# Patient Record
Sex: MALE | Race: WHITE
[De-identification: names, ages, dates, MRNs, and addresses within clinical notes are randomized per-mention and may not be internally consistent; named-entity substitution may affect disease eponyms.]

---

## 2017-02-10 ENCOUNTER — HOSPITAL ENCOUNTER (EMERGENCY)
Dept: HOSPITAL 62 - ER | Age: 62
LOS: 1 days | Discharge: HOME | End: 2017-02-11
Payer: COMMERCIAL

## 2017-02-10 DIAGNOSIS — R11.0: ICD-10-CM

## 2017-02-10 DIAGNOSIS — I71.2: ICD-10-CM

## 2017-02-10 DIAGNOSIS — R42: Primary | ICD-10-CM

## 2017-02-10 PROCEDURE — 85025 COMPLETE CBC W/AUTO DIFF WBC: CPT

## 2017-02-10 PROCEDURE — 82550 ASSAY OF CK (CPK): CPT

## 2017-02-10 PROCEDURE — 71275 CT ANGIOGRAPHY CHEST: CPT

## 2017-02-10 PROCEDURE — 80053 COMPREHEN METABOLIC PANEL: CPT

## 2017-02-10 PROCEDURE — 82553 CREATINE MB FRACTION: CPT

## 2017-02-10 PROCEDURE — 99285 EMERGENCY DEPT VISIT HI MDM: CPT

## 2017-02-10 PROCEDURE — 36415 COLL VENOUS BLD VENIPUNCTURE: CPT

## 2017-02-10 PROCEDURE — 84484 ASSAY OF TROPONIN QUANT: CPT

## 2017-02-11 VITALS — DIASTOLIC BLOOD PRESSURE: 76 MMHG | SYSTOLIC BLOOD PRESSURE: 112 MMHG

## 2017-02-11 LAB
ALBUMIN SERPL-MCNC: 3.5 G/DL (ref 3.5–5)
ALP SERPL-CCNC: 56 U/L (ref 38–126)
ALT SERPL-CCNC: 38 U/L (ref 21–72)
ANION GAP SERPL CALC-SCNC: 12 MMOL/L (ref 5–19)
AST SERPL-CCNC: 33 U/L (ref 17–59)
BASOPHILS # BLD AUTO: 0 10^3/UL (ref 0–0.2)
BASOPHILS NFR BLD AUTO: 0.6 % (ref 0–2)
BILIRUB DIRECT SERPL-MCNC: 0 MG/DL (ref 0–0.3)
BILIRUB SERPL-MCNC: 0.5 MG/DL (ref 0.2–1.3)
BUN SERPL-MCNC: 20 MG/DL (ref 7–20)
CALCIUM: 8.6 MG/DL (ref 8.4–10.2)
CHLORIDE SERPL-SCNC: 104 MMOL/L (ref 98–107)
CK MB SERPL-MCNC: 1.3 NG/ML (ref ?–4.55)
CK SERPL-CCNC: 102 U/L (ref 55–170)
CO2 SERPL-SCNC: 24 MMOL/L (ref 22–30)
CREAT SERPL-MCNC: 0.88 MG/DL (ref 0.52–1.25)
EOSINOPHIL # BLD AUTO: 0.2 10^3/UL (ref 0–0.6)
EOSINOPHIL NFR BLD AUTO: 3.8 % (ref 0–6)
ERYTHROCYTE [DISTWIDTH] IN BLOOD BY AUTOMATED COUNT: 13.2 % (ref 11.5–14)
GLUCOSE SERPL-MCNC: 105 MG/DL (ref 75–110)
HCT VFR BLD CALC: 41.6 % (ref 37.9–51)
HGB BLD-MCNC: 13.9 G/DL (ref 13.5–17)
HGB HCT DIFFERENCE: 0.1
LYMPHOCYTES # BLD AUTO: 1.1 10^3/UL (ref 0.5–4.7)
LYMPHOCYTES NFR BLD AUTO: 17.6 % (ref 13–45)
MCH RBC QN AUTO: 30.2 PG (ref 27–33.4)
MCHC RBC AUTO-ENTMCNC: 33.3 G/DL (ref 32–36)
MCV RBC AUTO: 91 FL (ref 80–97)
MONOCYTES # BLD AUTO: 0.8 10^3/UL (ref 0.1–1.4)
MONOCYTES NFR BLD AUTO: 12.1 % (ref 3–13)
NEUTROPHILS # BLD AUTO: 4.1 10^3/UL (ref 1.7–8.2)
NEUTS SEG NFR BLD AUTO: 65.9 % (ref 42–78)
POTASSIUM SERPL-SCNC: 4 MMOL/L (ref 3.6–5)
PROT SERPL-MCNC: 6.7 G/DL (ref 6.3–8.2)
RBC # BLD AUTO: 4.59 10^6/UL (ref 4.35–5.55)
SODIUM SERPL-SCNC: 139.7 MMOL/L (ref 137–145)
TROPONIN I SERPL-MCNC: < 0.012 NG/ML
WBC # BLD AUTO: 6.3 10^3/UL (ref 4–10.5)

## 2017-02-11 NOTE — ER DOCUMENT REPORT
[id number]


Notes: 


Patient is a [age]-year-old male who presents with complaint of vertigo type 

dizziness.  He says whenever he moves around he gets a spinning type sensation 

and feels nauseous unwell.  No headache.  No chest pain.  No shortness of 

breath.  No back pain.  No abdominal pain.  No focal weakness or numbness.  He 

says his symptoms have improved since arriving to ER.  He still has some 

recurrence of vertigo if he turns his head.  Patient also mentions to me that 

he has no aortic dissection.  He says he was told this by Dr. Fink.  He is 

currently unclear if this is truly a dissection or if they misunderstood Dr. Fink as it would not make sense to manage a descending aortic dissection 

outpatient.


TRAVEL OUTSIDE OF THE [place] IN LAST 30 DAYS: No





- Related Data


Allergies/Adverse Reactions: 


 





codeine Adverse Reaction (Verified 02/10/17 23:59)


 











Past Medical History





- Social History


Smoking Status: Never Smoker


Chew tobacco use (# tins/day): No


Frequency of alcohol use: None


Drug Abuse: None


Family History: Reviewed & Not Pertinent


Patient has suicidal ideation: No


Patient has homicidal ideation: No


Renal/ Medical History: Denies: Hx Peritoneal Dialysis





Review of Systems





- Review of Systems


Notes: 


My Normal Review Basic





REVIEW OF SYSTEMS:


CONSTITUTIONAL :  Denies fever,  chills, or sweats.  Denies recent illness.


EENT:   Denies eye, ear, throat, or mouth pain or symptoms.  Denies nasal or 

sinus congestion.


CARDIOVASCULAR:  Denies chest pain.


RESPIRATORY:  Denies cough, cold, or chest congestion.  Denies shortness of 

breath, difficulty breathing, or wheezing.


GASTROINTESTINAL:  Denies abdominal pain.  Denies nausea, vomiting, or 

diarrhea.  Denies constipation.  Last BM: 


MUSCULOSKELETAL:  Denies neck or back pain or joint pain or swelling.


SKIN:   Denies rash or skin lesions.


NEUROLOGICAL:  Denies altered mental status or loss of consciousness.  Denies 

headache.  Denies weakness or paralysis or loss of use of either side.  Denies 

problems with gait or speech.  Denies sensory or motor loss.


ALL OTHER SYSTEMS REVIEWED AND NEGATIVE.





Physical Exam





- Vital signs


Vitals: 


 











Pulse Resp BP Pulse Ox


 


 49 L  16   133/78 H  100 


 


 02/11/17 00:00  02/11/17 00:00  02/11/17 00:00  02/11/17 00:00














- Notes


Notes: 


General Appearance: Well nourished, alert, cooperative, no acute distress, no 

obvious discomfort.  Very well-appearing.


Vitals: reviewed, See vital signs table.


Head: no swelling or tenderness to the head


Eyes: PERRL, EOMI, Conjuctiva clear


Mouth: No decreasd moisture


Neck: Supple, no neck tenderness, No thyromegaly


Lungs: No wheezing, No rales, No rhonci, No accessory muscle use, good air 

exchange bilaterally.


Heart: Normal rate, Regular rythm, No murmur, no rub


Abdomen: Normal BS, soft, No rigidity, No abdominal tenderness, No guarding, no 

rebound, no abdominal masses, no organomegaly


Extremities: strength 5/5 in all extremities, good pulses in all extremities, 

no swelling or tenderness in the extremities, no edema.


Skin: warm, dry, appropriate color, no rash


Neuro: speech clear, oriented x 3, normal affect, responds appropriately to 

questions.  Cranial nerves II through XII are intact.  Distal sensation intact.

  Patient moves all extremities without difficulty.





Course





- Re-evaluation


Re-evalutation: 


02/11/17 01:42


Patient's says that he was told by Dr. Fink that he has a leaky aortic valve and 

a small aortic dissection.  I'll try to clarify if he is being told that he is 

at risk for dissection or feels that she told he had a dissection.  It does not 

make sense to me that the patient would have a Mayorga a type dissection and 

not get immediate treatment for this.  His dizziness sounds just like vertigo.  

It does not sound like anything concerning; left, patient says he did feel weak 

with it ends if there is any chance she could have a dissection felt important 

to find this out.  They do have a tape recording of Dr. Fink is talking about 

this.  They're unable to find the  recording at this time.  If they are able to 

find the recording later and I'm able to listen to find out that there actually 

is a dissection than I will cancel the CT scan because patient otherwise has no 

symptoms of a dissection.





02/11/17 01:43








- Vital Signs


Vital signs: 


 











Temp Pulse Resp BP Pulse Ox


 


 97.8 F   49 L  12   112/76   98 


 


 02/11/17 00:05  02/11/17 00:00  02/11/17 02:01  02/11/17 02:01  02/11/17 02:01














- Laboratory


Result Diagrams: 


 02/11/17 01:19





 02/11/17 01:19


Laboratory results interpreted by me: 


 











  02/11/17





  01:19


 


Plt Count  89 L














- Transfer of Care


Notes: 





02/11/17 06:10


Patient symptoms are completely resolved.  He has no reproducible vertigo my 

exam.  I did obtain a CT angiogram due to the concern of the history of 

possible dissection.  CT Angio of the chest showed a thoracic aortic aneurysm 

of 4.4 cm.  It is under 5 cm horizontally intervention at this time.  I do not 

suspect his symptoms of vertigo or related to the aneurysm.  He had no 

associated chest pain, shortness of breath, or back pain.  I did explain this 

to the patient at length and discussed what a thoracic aortic aneurysm is and 

symptoms that would be concerning associate with that.  He says that Dr. Fink 

did mention to him that they would continue to follow this with CT scans which 

makes sense in conjunction with an aneurysm.  Again there is no evidence of 

dissection and scan.  He has no symptoms to suggest dissection.  I strongly 

encouraged patient to return to ER immediately if he has recurrent worsening 

vertigo, any chest pain, any back pain, any difficulty breathing, or if he 

feels unwell.  Patient agrees with plan and will be discharged home.





Dictation of this chart was performed using voice recognition software; 

therefore, there may be some unintended grammatical errors.





Discharge





- Discharge


Clinical Impression: 


 Vertigo





Thoracic aortic aneurysm


Qualifiers:


 Presence of rupture: without rupture Qualified Code(s): I71.2 - Thoracic 

aortic aneurysm, without rupture





Condition: Good


Disposition: HOME, SELF-CARE


Additional Instructions: 


Please continue to follow closely with Dr. Fink for continued monitoring of your 

aneurysm.  Please return to ER immediately if you have any chest pain, any back 

pain, difficulty breathing, or feel unwell.  Please take the meclizine for the 

vertigo type symptoms.  If you continue to have vertigo despite the meclizine 

please return to ER for evaluation.  Please follow-up with your family doctor 

for continued workup and treatment of your vertigo.


Prescriptions: 


Meclizine HCl 25 mg PO TIDP PRN #20 tablet


 PRN Reason: dizziness


Forms:  Return to Work

## 2019-03-23 ENCOUNTER — HOSPITAL ENCOUNTER (OUTPATIENT)
Dept: HOSPITAL 62 - OD | Age: 64
End: 2019-03-23
Attending: INTERNAL MEDICINE
Payer: COMMERCIAL

## 2019-03-23 DIAGNOSIS — Z79.899: ICD-10-CM

## 2019-03-23 DIAGNOSIS — K76.9: ICD-10-CM

## 2019-03-23 DIAGNOSIS — R03.0: ICD-10-CM

## 2019-03-23 DIAGNOSIS — E78.00: Primary | ICD-10-CM

## 2019-03-23 LAB
ALBUMIN SERPL-MCNC: 3.9 G/DL (ref 3.5–5)
ALP SERPL-CCNC: 67 U/L (ref 38–126)
ALT SERPL-CCNC: 35 U/L (ref 21–72)
AST SERPL-CCNC: 34 U/L (ref 17–59)
BILIRUB DIRECT SERPL-MCNC: 0.1 MG/DL (ref 0–0.4)
BILIRUB SERPL-MCNC: 0.7 MG/DL (ref 0.2–1.3)
CHOLEST SERPL-MCNC: 110.05 MG/DL (ref 0–200)
LDLC SERPL DIRECT ASSAY-MCNC: 57 MG/DL (ref ?–100)
PROT SERPL-MCNC: 6.8 G/DL (ref 6.3–8.2)
TRIGL SERPL-MCNC: 116 MG/DL (ref ?–150)
VLDLC SERPL CALC-MCNC: 23 MG/DL (ref 10–31)

## 2019-03-23 PROCEDURE — 80061 LIPID PANEL: CPT

## 2019-03-23 PROCEDURE — 80048 BASIC METABOLIC PNL TOTAL CA: CPT

## 2019-03-23 PROCEDURE — 80076 HEPATIC FUNCTION PANEL: CPT

## 2019-03-23 PROCEDURE — 36415 COLL VENOUS BLD VENIPUNCTURE: CPT

## 2019-03-26 LAB
ANION GAP SERPL CALC-SCNC: 10 MMOL/L (ref 5–19)
BUN SERPL-MCNC: 23 MG/DL (ref 7–20)
CALCIUM: 8.8 MG/DL (ref 8.4–10.2)
CHLORIDE SERPL-SCNC: 103 MMOL/L (ref 98–107)
CO2 SERPL-SCNC: 27 MMOL/L (ref 22–30)
GLUCOSE SERPL-MCNC: 95 MG/DL (ref 75–110)
POTASSIUM SERPL-SCNC: 4.3 MMOL/L (ref 3.6–5)
SODIUM SERPL-SCNC: 140.4 MMOL/L (ref 137–145)